# Patient Record
Sex: FEMALE
[De-identification: names, ages, dates, MRNs, and addresses within clinical notes are randomized per-mention and may not be internally consistent; named-entity substitution may affect disease eponyms.]

---

## 2022-02-26 ENCOUNTER — NURSE TRIAGE (OUTPATIENT)
Dept: OTHER | Facility: CLINIC | Age: 32
End: 2022-02-26

## 2022-02-26 NOTE — TELEPHONE ENCOUNTER
Subjective: Caller states \"I had shoulder surgery Thursday and the arm where I had my IV is now swollen\"     Current Symptoms: IV was in back of left hand, swelling begins a few inches above there and is about 3 inches wide. Is sore. Denies redness but can feel hardened \"line\" that extends from where IV site was. Onset: 1 day ago; gradual    Associated Symptoms: NA    Pain Severity: 5/10; aching; moderate    Temperature: denies na    What has been tried: na    LMP: NA Pregnant: No    Recommended disposition: Go to ED Now    Care advice provided, patient verbalizes understanding; denies any other questions or concerns; instructed to call back for any new or worsening symptoms. Patient/caller agrees to proceed to nearest Emergency Department    This triage is a result of a call to [address] [place] [address]. Please do not respond to the triage nurse through this encounter. Any subsequent communication should be directly with the patient.     Reason for Disposition   Pain, redness, or swelling intravenous (IV) site or along course of vein   Arm is swollen, new-onset (or leg swelling if IV in lower extremity)    Protocols used: ARM SWELLING AND EDEMA-ADULT-, IV SITE (SKIN) [facility]